# Patient Record
Sex: MALE | Race: WHITE | NOT HISPANIC OR LATINO | URBAN - METROPOLITAN AREA
[De-identification: names, ages, dates, MRNs, and addresses within clinical notes are randomized per-mention and may not be internally consistent; named-entity substitution may affect disease eponyms.]

---

## 2020-07-25 ENCOUNTER — TELEPHONE ENCOUNTER (OUTPATIENT)
Dept: URBAN - METROPOLITAN AREA CLINIC 13 | Facility: CLINIC | Age: 80
End: 2020-07-25

## 2020-07-25 RX ORDER — POLYETHYLENE GLYCOL 3350, SODIUM SULFATE, SODIUM CHLORIDE, POTASSIUM CHLORIDE, ASCORBIC ACID, SODIUM ASCORBATE 7.5-2.691G
USE AS DIRECTED KIT ORAL
Qty: 1 | Refills: 0 | OUTPATIENT
Start: 2012-06-28 | End: 2012-07-24

## 2020-07-25 RX ORDER — MULTIVITAMIN
TAKE 1 CAPSULE DAILY CAPSULE ORAL
Refills: 0 | OUTPATIENT
End: 2017-08-31

## 2020-07-25 RX ORDER — MELOXICAM 7.5 MG/1
TAKE 1 TABLET DAILY TABLET ORAL
Refills: 0 | OUTPATIENT
End: 2017-08-31

## 2020-07-25 RX ORDER — PANTOPRAZOLE SODIUM 40 MG/1
TAKE 1 TABLET DAILY TABLET, DELAYED RELEASE ORAL
Qty: 30 | Refills: 2 | OUTPATIENT
Start: 2014-10-13 | End: 2017-08-31

## 2020-07-25 RX ORDER — POLYETHYLENE GLYCOL 3350, SODIUM CHLORIDE, SODIUM BICARBONATE AND POTASSIUM CHLORIDE WITH LEMON FLAVOR 420; 11.2; 5.72; 1.48 G/4L; G/4L; G/4L; G/4L
TAKE 1/2 GALLON AT 5:00 PM DAY BEFORE PROCEDURE, TAKE SECOND 1/2 OF GALLON 6 HRS PRIOR TO PROCEDURE POWDER, FOR SOLUTION ORAL
Qty: 1 | Refills: 0 | OUTPATIENT
Start: 2017-09-25 | End: 2017-10-04

## 2020-07-25 RX ORDER — POLYETHYLENE GLYCOL 3350, SODIUM CHLORIDE, SODIUM BICARBONATE AND POTASSIUM CHLORIDE WITH LEMON FLAVOR 420; 11.2; 5.72; 1.48 G/4L; G/4L; G/4L; G/4L
TAKE 1/2 GALLON AT 5:00 PM DAY BEFORE PROCEDURE, TAKE SECOND 1/2 OF GALLON 6 HRS PRIOR TO PROCEDURE POWDER, FOR SOLUTION ORAL
Qty: 1 | Refills: 0 | OUTPATIENT
Start: 2017-10-25 | End: 2018-04-18

## 2020-07-25 RX ORDER — POLYETHYLENE GLYCOL 3350, SODIUM SULFATE, SODIUM CHLORIDE, POTASSIUM CHLORIDE, ASCORBIC ACID, SODIUM ASCORBATE 7.5-2.691G
DRINK HALF OF THE SOLUTION AT 5 PM THE DAY BEFORE PROCEDURE AND THEN LAST HALF 6 HOURS PRIOR TO PROCEDURE KIT ORAL
Qty: 1 | Refills: 0 | OUTPATIENT
Start: 2017-09-14 | End: 2017-10-04

## 2020-07-26 ENCOUNTER — TELEPHONE ENCOUNTER (OUTPATIENT)
Dept: URBAN - METROPOLITAN AREA CLINIC 13 | Facility: CLINIC | Age: 80
End: 2020-07-26

## 2020-07-26 RX ORDER — LISINOPRIL 10 MG/1
TABLET ORAL
Qty: 90 | Refills: 0 | Status: ACTIVE | COMMUNITY
Start: 2018-04-11

## 2020-07-26 RX ORDER — METHYLPREDNISOLONE 4 MG/1
TABLET ORAL
Qty: 21 | Refills: 0 | Status: ACTIVE | COMMUNITY
Start: 2017-06-23

## 2020-07-26 RX ORDER — CEPHALEXIN 500 MG/1
CAPSULE ORAL
Qty: 15 | Refills: 0 | Status: ACTIVE | COMMUNITY
Start: 2013-11-11

## 2020-07-26 RX ORDER — CHLOROQUINE PHOSPHATE 500 MG/1
TABLET ORAL
Qty: 12 | Refills: 0 | Status: ACTIVE | COMMUNITY
Start: 2014-06-13

## 2020-07-26 RX ORDER — SCOPOLAMINE 1 MG/3 DAY
PATCH,TRANSDERMAL 3 DAY TRANSDERMAL
Qty: 10 | Refills: 0 | Status: ACTIVE | COMMUNITY
Start: 2017-07-31

## 2020-07-26 RX ORDER — LISINOPRIL 10 MG/1
TABLET ORAL
Qty: 90 | Refills: 0 | Status: ACTIVE | COMMUNITY
Start: 2017-02-04

## 2020-07-26 RX ORDER — COLCHICINE 0.6 MG/1
TABLET, FILM COATED ORAL
Qty: 7 | Refills: 0 | Status: ACTIVE | COMMUNITY
Start: 2017-12-06

## 2020-07-26 RX ORDER — DICLOFENAC SODIUM 1 %
GEL (GRAM) TOPICAL
Qty: 100 | Refills: 0 | Status: ACTIVE | COMMUNITY
Start: 2014-11-12

## 2020-07-26 RX ORDER — HYDROCORTISONE 25 MG/G
CREAM TOPICAL
Qty: 60 | Refills: 0 | Status: ACTIVE | COMMUNITY
Start: 2014-07-14

## 2020-07-26 RX ORDER — LISINOPRIL 10 MG/1
TABLET ORAL
Qty: 90 | Refills: 0 | Status: ACTIVE | COMMUNITY
Start: 2017-06-20

## 2020-07-26 RX ORDER — AMOXICILLIN 500 MG/1
CAPSULE ORAL
Qty: 33 | Refills: 0 | Status: ACTIVE | COMMUNITY
Start: 2016-10-27

## 2020-07-26 RX ORDER — SIMVASTATIN 40 MG/1
TAKE 1 TABLET DAILY TABLET, FILM COATED ORAL
Qty: 90 | Refills: 0 | Status: ACTIVE | COMMUNITY
Start: 2011-11-28

## 2020-07-26 RX ORDER — OXYCODONE AND ACETAMINOPHEN 5; 325 MG/1; MG/1
TABLET ORAL
Qty: 90 | Refills: 0 | Status: ACTIVE | COMMUNITY
Start: 2017-05-12

## 2020-07-26 RX ORDER — METHYLPREDNISOLONE 4 MG/1
TABLET ORAL
Qty: 21 | Refills: 0 | Status: ACTIVE | COMMUNITY
Start: 2017-05-15

## 2020-07-26 RX ORDER — AMMONIUM LACTATE 12 %
CREAM (GRAM) TOPICAL
Qty: 385 | Refills: 0 | Status: ACTIVE | COMMUNITY
Start: 2018-01-02

## 2020-07-26 RX ORDER — NEOMYCIN AND POLYMYXIN B SULFATES AND DEXAMETHASONE 1; 3.5; 1 MG/G; MG/G; [IU]/G
OINTMENT OPHTHALMIC
Qty: 4 | Refills: 0 | Status: ACTIVE | COMMUNITY
Start: 2014-01-10

## 2020-07-26 RX ORDER — PANTOPRAZOLE SODIUM 40 MG/1
TAKE 1 TABLET DAILY TABLET, DELAYED RELEASE ORAL
Refills: 2 | Status: ACTIVE | COMMUNITY

## 2020-07-26 RX ORDER — CYCLOBENZAPRINE HYDROCHLORIDE 10 MG/1
TABLET, FILM COATED ORAL
Qty: 20 | Refills: 0 | Status: ACTIVE | COMMUNITY
Start: 2017-04-14

## 2020-07-26 RX ORDER — METHYLPREDNISOLONE 4 MG/1
TABLET ORAL
Qty: 21 | Refills: 0 | Status: ACTIVE | COMMUNITY
Start: 2017-04-14

## 2020-07-26 RX ORDER — MELOXICAM 15 MG/1
TABLET ORAL
Qty: 90 | Refills: 0 | Status: ACTIVE | COMMUNITY
Start: 2011-07-22

## 2020-07-26 RX ORDER — LISINOPRIL 20 MG/1
TAKE 1 TABLET DAILY TABLET ORAL
Qty: 90 | Refills: 0 | Status: ACTIVE | COMMUNITY
Start: 2011-09-27

## 2020-07-26 RX ORDER — TRAMADOL HYDROCHLORIDE 50 MG/1
TABLET ORAL
Qty: 30 | Refills: 0 | Status: ACTIVE | COMMUNITY
Start: 2017-12-06

## 2020-07-26 RX ORDER — LISINOPRIL 20 MG/1
TABLET ORAL
Qty: 90 | Refills: 0 | Status: ACTIVE | COMMUNITY
Start: 2011-07-05

## 2020-07-26 RX ORDER — SIMVASTATIN 40 MG/1
TABLET, FILM COATED ORAL
Qty: 90 | Refills: 0 | Status: ACTIVE | COMMUNITY
Start: 2012-06-04

## 2020-07-26 RX ORDER — MELOXICAM 15 MG/1
TABLET ORAL
Qty: 90 | Refills: 0 | Status: ACTIVE | COMMUNITY
Start: 2012-04-23

## 2020-07-26 RX ORDER — LISINOPRIL 10 MG/1
TABLET ORAL
Qty: 90 | Refills: 0 | Status: ACTIVE | COMMUNITY
Start: 2017-10-25

## 2020-07-26 RX ORDER — LOTEPREDNOL ETABONATE AND TOBRAMYCIN 5; 3 MG/ML; MG/ML
SUSPENSION/ DROPS OPHTHALMIC
Qty: 5 | Refills: 0 | Status: ACTIVE | COMMUNITY
Start: 2013-12-20

## 2020-07-26 RX ORDER — OXYCODONE AND ACETAMINOPHEN 5; 325 MG/1; MG/1
TABLET ORAL
Qty: 30 | Refills: 0 | Status: ACTIVE | COMMUNITY
Start: 2017-04-14

## 2022-09-05 PROBLEM — 724538004: Status: ACTIVE | Noted: 2022-09-05

## 2022-09-06 ENCOUNTER — OFFICE VISIT (OUTPATIENT)
Dept: URBAN - METROPOLITAN AREA CLINIC 113 | Facility: CLINIC | Age: 82
End: 2022-09-06

## 2022-09-06 NOTE — HPI-OTHER HISTORIES
Colonoscopy on 4/18/2018 revealed a normal terminal ileum, multiple left colonic diverticuli.  There was a 6 mm polyp in the appendiceal orifice extending into the appendiceal orifice.  Biopsies revealed sessile serrated adenoma.  There were 2 adenomatous polyps in the ascending colon each 3 mm in size.  There were 3 tubular adenoma/sessile serrated adenomas 3 to 6 mm in size in the transverse colon.  There was a tattoo at the hepatic flexure that was normal with no residual polyp tissue.  Laparoscopic appendectomy and partial cecal resection revealed a sessile serrated polyp at the appendiceal orifice 9 mm in size with a 1 mesenteric lymph node that was negative.  There was fibrous obliteration of the tip of the appendix.

## 2022-09-06 NOTE — HPI-TODAY'S VISIT:
82-year-old with history of hypertension hyperlipidemia and prostate cancer who was a history of adenomatous colon polyps.

## 2022-10-27 ENCOUNTER — OFFICE VISIT (OUTPATIENT)
Dept: URBAN - METROPOLITAN AREA CLINIC 113 | Facility: CLINIC | Age: 82
End: 2022-10-27

## 2022-11-01 ENCOUNTER — DASHBOARD ENCOUNTERS (OUTPATIENT)
Age: 82
End: 2022-11-01

## 2022-11-01 ENCOUNTER — LAB OUTSIDE AN ENCOUNTER (OUTPATIENT)
Dept: URBAN - METROPOLITAN AREA CLINIC 113 | Facility: CLINIC | Age: 82
End: 2022-11-01

## 2022-11-01 ENCOUNTER — OFFICE VISIT (OUTPATIENT)
Dept: URBAN - METROPOLITAN AREA CLINIC 113 | Facility: CLINIC | Age: 82
End: 2022-11-01
Payer: MEDICARE

## 2022-11-01 VITALS — BODY MASS INDEX: 23.66 KG/M2 | WEIGHT: 169 LBS | HEIGHT: 71 IN | RESPIRATION RATE: 18 BRPM | TEMPERATURE: 97.7 F

## 2022-11-01 DIAGNOSIS — Z86.010 HISTORY OF ADENOMATOUS POLYP OF COLON: ICD-10-CM

## 2022-11-01 PROCEDURE — 99202 OFFICE O/P NEW SF 15 MIN: CPT | Performed by: NURSE PRACTITIONER

## 2022-11-01 RX ORDER — LOTEPREDNOL ETABONATE AND TOBRAMYCIN 5; 3 MG/ML; MG/ML
SUSPENSION/ DROPS OPHTHALMIC
Qty: 5 | Refills: 0 | Status: ON HOLD | COMMUNITY
Start: 2013-12-20

## 2022-11-01 RX ORDER — MELOXICAM 15 MG/1
TABLET ORAL
Qty: 90 | Refills: 0 | Status: ACTIVE | COMMUNITY
Start: 2011-07-22

## 2022-11-01 RX ORDER — LISINOPRIL 10 MG/1
TABLET ORAL
Qty: 90 | Refills: 0 | Status: ON HOLD | COMMUNITY
Start: 2017-02-04

## 2022-11-01 RX ORDER — CHLOROQUINE PHOSPHATE 500 MG/1
TABLET ORAL
Qty: 12 | Refills: 0 | Status: ON HOLD | COMMUNITY
Start: 2014-06-13

## 2022-11-01 RX ORDER — CEPHALEXIN 500 MG/1
CAPSULE ORAL
Qty: 15 | Refills: 0 | Status: ON HOLD | COMMUNITY
Start: 2013-11-11

## 2022-11-01 RX ORDER — OXYCODONE AND ACETAMINOPHEN 5; 325 MG/1; MG/1
TABLET ORAL
Qty: 30 | Refills: 0 | Status: ON HOLD | COMMUNITY
Start: 2017-04-14

## 2022-11-01 RX ORDER — HYDROCORTISONE 25 MG/G
CREAM TOPICAL
Qty: 60 | Refills: 0 | Status: ON HOLD | COMMUNITY
Start: 2014-07-14

## 2022-11-01 RX ORDER — PANTOPRAZOLE SODIUM 40 MG/1
TAKE 1 TABLET DAILY TABLET, DELAYED RELEASE ORAL
Refills: 2 | Status: ACTIVE | COMMUNITY

## 2022-11-01 RX ORDER — SCOPOLAMINE 1 MG/3 DAY
PATCH,TRANSDERMAL 3 DAY TRANSDERMAL
Qty: 10 | Refills: 0 | Status: ACTIVE | COMMUNITY
Start: 2017-07-31

## 2022-11-01 RX ORDER — AMMONIUM LACTATE 12 %
CREAM (GRAM) TOPICAL
Qty: 385 | Refills: 0 | Status: ON HOLD | COMMUNITY
Start: 2018-01-02

## 2022-11-01 RX ORDER — CYCLOBENZAPRINE HYDROCHLORIDE 10 MG/1
TABLET, FILM COATED ORAL
Qty: 20 | Refills: 0 | Status: ACTIVE | COMMUNITY
Start: 2017-04-14

## 2022-11-01 RX ORDER — TRAMADOL HYDROCHLORIDE 50 MG/1
TABLET ORAL
Qty: 30 | Refills: 0 | Status: ON HOLD | COMMUNITY
Start: 2017-12-06

## 2022-11-01 RX ORDER — DICLOFENAC SODIUM 1 %
GEL (GRAM) TOPICAL
Qty: 100 | Refills: 0 | Status: ON HOLD | COMMUNITY
Start: 2014-11-12

## 2022-11-01 RX ORDER — SIMVASTATIN 40 MG/1
TAKE 1 TABLET DAILY TABLET, FILM COATED ORAL
Qty: 90 | Refills: 0 | Status: ACTIVE | COMMUNITY
Start: 2011-11-28

## 2022-11-01 RX ORDER — COLCHICINE 0.6 MG/1
TABLET, FILM COATED ORAL
Qty: 7 | Refills: 0 | Status: ACTIVE | COMMUNITY
Start: 2017-12-06

## 2022-11-01 RX ORDER — LISINOPRIL 20 MG/1
TABLET ORAL
Qty: 90 | Refills: 0 | Status: ACTIVE | COMMUNITY
Start: 2011-07-05

## 2022-11-01 RX ORDER — AMOXICILLIN 500 MG/1
CAPSULE ORAL
Qty: 33 | Refills: 0 | Status: ON HOLD | COMMUNITY
Start: 2016-10-27

## 2022-11-01 RX ORDER — NEOMYCIN AND POLYMYXIN B SULFATES AND DEXAMETHASONE 1; 3.5; 1 MG/G; MG/G; [IU]/G
OINTMENT OPHTHALMIC
Qty: 4 | Refills: 0 | Status: ON HOLD | COMMUNITY
Start: 2014-01-10

## 2022-11-01 RX ORDER — METHYLPREDNISOLONE 4 MG/1
TABLET ORAL
Qty: 21 | Refills: 0 | Status: ON HOLD | COMMUNITY
Start: 2017-04-14

## 2022-11-01 NOTE — HPI-TODAY'S VISIT:
This is an 82-year-old male presenting to discuss a colonoscopy. He has a personal history of piecemeal removal of large sessile adenomatous polyp colonoscopy in 2017.  Repeat colonoscopy in 2018 was again notable for removal of tubular and sessile serrated adenomas.  In August 2018 he underwent laparoscopic appendectomy with partial cecectomy for removal of an adenomatous polyp emanating from the appendiceal orifice, previously noted to measure 6 mm in size with irregular mucosa extending into the appendiceal orifice region.   He is without any abdominal complaints. No dysphagia, heartburn, regurgitation, unintentional weight loss, nausea, vomiting, hematemesis or melena. Bowels are moving regularly without blood per rectum. No complaints of bloating. No jaundice, icterus.

## 2022-11-23 ENCOUNTER — OFFICE VISIT (OUTPATIENT)
Dept: URBAN - METROPOLITAN AREA CLINIC 113 | Facility: CLINIC | Age: 82
End: 2022-11-23

## 2022-12-14 ENCOUNTER — CLAIMS CREATED FROM THE CLAIM WINDOW (OUTPATIENT)
Dept: URBAN - METROPOLITAN AREA SURGERY CENTER 25 | Facility: SURGERY CENTER | Age: 82
End: 2022-12-14

## 2022-12-14 ENCOUNTER — CLAIMS CREATED FROM THE CLAIM WINDOW (OUTPATIENT)
Dept: URBAN - METROPOLITAN AREA CLINIC 4 | Facility: CLINIC | Age: 82
End: 2022-12-14
Payer: MEDICARE

## 2022-12-14 ENCOUNTER — CLAIMS CREATED FROM THE CLAIM WINDOW (OUTPATIENT)
Dept: URBAN - METROPOLITAN AREA SURGERY CENTER 25 | Facility: SURGERY CENTER | Age: 82
End: 2022-12-14
Payer: MEDICARE

## 2022-12-14 DIAGNOSIS — D12.2 ADENOMA OF ASCENDING COLON: ICD-10-CM

## 2022-12-14 DIAGNOSIS — Z86.010 HISTORY OF ADENOMATOUS POLYP OF COLON: ICD-10-CM

## 2022-12-14 DIAGNOSIS — D12.3 ADENOMA OF TRANSVERSE COLON: ICD-10-CM

## 2022-12-14 DIAGNOSIS — D12.3 BENIGN NEOPLASM OF TRANSVERSE COLON: ICD-10-CM

## 2022-12-14 PROCEDURE — G8907 PT DOC NO EVENTS ON DISCHARG: HCPCS | Performed by: INTERNAL MEDICINE

## 2022-12-14 PROCEDURE — 88305 TISSUE EXAM BY PATHOLOGIST: CPT | Performed by: PATHOLOGY

## 2022-12-14 PROCEDURE — 45385 COLONOSCOPY W/LESION REMOVAL: CPT | Performed by: INTERNAL MEDICINE

## 2022-12-14 RX ORDER — AMMONIUM LACTATE 12 %
CREAM (GRAM) TOPICAL
Qty: 385 | Refills: 0 | Status: ON HOLD | COMMUNITY
Start: 2018-01-02

## 2022-12-14 RX ORDER — TRAMADOL HYDROCHLORIDE 50 MG/1
TABLET ORAL
Qty: 30 | Refills: 0 | Status: ON HOLD | COMMUNITY
Start: 2017-12-06

## 2022-12-14 RX ORDER — PANTOPRAZOLE SODIUM 40 MG/1
TAKE 1 TABLET DAILY TABLET, DELAYED RELEASE ORAL
Refills: 2 | Status: ACTIVE | COMMUNITY

## 2022-12-14 RX ORDER — SCOPOLAMINE 1 MG/3 DAY
PATCH,TRANSDERMAL 3 DAY TRANSDERMAL
Qty: 10 | Refills: 0 | Status: ACTIVE | COMMUNITY
Start: 2017-07-31

## 2022-12-14 RX ORDER — LISINOPRIL 20 MG/1
TABLET ORAL
Qty: 90 | Refills: 0 | Status: ACTIVE | COMMUNITY
Start: 2011-07-05

## 2022-12-14 RX ORDER — DICLOFENAC SODIUM 1 %
GEL (GRAM) TOPICAL
Qty: 100 | Refills: 0 | Status: ON HOLD | COMMUNITY
Start: 2014-11-12

## 2022-12-14 RX ORDER — LISINOPRIL 10 MG/1
TABLET ORAL
Qty: 90 | Refills: 0 | Status: ON HOLD | COMMUNITY
Start: 2017-02-04

## 2022-12-14 RX ORDER — METHYLPREDNISOLONE 4 MG/1
TABLET ORAL
Qty: 21 | Refills: 0 | Status: ON HOLD | COMMUNITY
Start: 2017-04-14

## 2022-12-14 RX ORDER — MELOXICAM 15 MG/1
TABLET ORAL
Qty: 90 | Refills: 0 | Status: ACTIVE | COMMUNITY
Start: 2011-07-22

## 2022-12-14 RX ORDER — CYCLOBENZAPRINE HYDROCHLORIDE 10 MG/1
TABLET, FILM COATED ORAL
Qty: 20 | Refills: 0 | Status: ACTIVE | COMMUNITY
Start: 2017-04-14

## 2022-12-14 RX ORDER — SIMVASTATIN 40 MG/1
TAKE 1 TABLET DAILY TABLET, FILM COATED ORAL
Qty: 90 | Refills: 0 | Status: ACTIVE | COMMUNITY
Start: 2011-11-28

## 2022-12-14 RX ORDER — LOTEPREDNOL ETABONATE AND TOBRAMYCIN 5; 3 MG/ML; MG/ML
SUSPENSION/ DROPS OPHTHALMIC
Qty: 5 | Refills: 0 | Status: ON HOLD | COMMUNITY
Start: 2013-12-20

## 2022-12-14 RX ORDER — HYDROCORTISONE 25 MG/G
CREAM TOPICAL
Qty: 60 | Refills: 0 | Status: ON HOLD | COMMUNITY
Start: 2014-07-14

## 2022-12-14 RX ORDER — COLCHICINE 0.6 MG/1
TABLET, FILM COATED ORAL
Qty: 7 | Refills: 0 | Status: ACTIVE | COMMUNITY
Start: 2017-12-06

## 2022-12-14 RX ORDER — OXYCODONE AND ACETAMINOPHEN 5; 325 MG/1; MG/1
TABLET ORAL
Qty: 30 | Refills: 0 | Status: ON HOLD | COMMUNITY
Start: 2017-04-14

## 2022-12-14 RX ORDER — CHLOROQUINE PHOSPHATE 500 MG/1
TABLET ORAL
Qty: 12 | Refills: 0 | Status: ON HOLD | COMMUNITY
Start: 2014-06-13

## 2022-12-14 RX ORDER — CEPHALEXIN 500 MG/1
CAPSULE ORAL
Qty: 15 | Refills: 0 | Status: ON HOLD | COMMUNITY
Start: 2013-11-11

## 2022-12-14 RX ORDER — AMOXICILLIN 500 MG/1
CAPSULE ORAL
Qty: 33 | Refills: 0 | Status: ON HOLD | COMMUNITY
Start: 2016-10-27

## 2022-12-14 RX ORDER — NEOMYCIN AND POLYMYXIN B SULFATES AND DEXAMETHASONE 1; 3.5; 1 MG/G; MG/G; [IU]/G
OINTMENT OPHTHALMIC
Qty: 4 | Refills: 0 | Status: ON HOLD | COMMUNITY
Start: 2014-01-10

## 2023-01-12 PROBLEM — 429047008: Status: ACTIVE | Noted: 2022-09-05

## 2023-01-13 ENCOUNTER — OFFICE VISIT (OUTPATIENT)
Dept: URBAN - METROPOLITAN AREA CLINIC 113 | Facility: CLINIC | Age: 83
End: 2023-01-13